# Patient Record
Sex: FEMALE | Race: WHITE | NOT HISPANIC OR LATINO | URBAN - METROPOLITAN AREA
[De-identification: names, ages, dates, MRNs, and addresses within clinical notes are randomized per-mention and may not be internally consistent; named-entity substitution may affect disease eponyms.]

---

## 2020-07-25 ENCOUNTER — TELEPHONE ENCOUNTER (OUTPATIENT)
Dept: URBAN - METROPOLITAN AREA CLINIC 13 | Facility: CLINIC | Age: 70
End: 2020-07-25

## 2020-07-25 RX ORDER — UBIDECARENONE/VIT E ACET 100MG-5
TAKE 1 CAPSULE DAILY 150 MG CAPSULE ORAL
Refills: 0 | OUTPATIENT
Start: 2008-07-18 | End: 2017-12-22

## 2020-07-25 RX ORDER — BENZONATATE 100 MG/1
TAKE 1 CAPSULE 3 TIMES DAILY AS NEEDED CAPSULE ORAL
Refills: 0 | OUTPATIENT
End: 2018-01-17

## 2020-07-25 RX ORDER — FAMOTIDINE 40 MG/1
TAKE 1 TABLET BEDTIME TABLET ORAL
Qty: 1 | Refills: 11 | OUTPATIENT
Start: 2018-01-17 | End: 2018-12-21

## 2020-07-25 RX ORDER — CETIRIZINE HYDROCHLORIDE 10 MG/1
TAKE 1 TABLET DAILY AS NEEDED TABLET, FILM COATED ORAL
Refills: 0 | OUTPATIENT
End: 2018-01-17

## 2020-07-25 RX ORDER — FLUOXETINE HYDROCHLORIDE 40 MG/1
TAKE 2 CAPSULES DAILY CAPSULE ORAL
Refills: 0 | OUTPATIENT
End: 2018-05-01

## 2020-07-26 ENCOUNTER — TELEPHONE ENCOUNTER (OUTPATIENT)
Dept: URBAN - METROPOLITAN AREA CLINIC 13 | Facility: CLINIC | Age: 70
End: 2020-07-26

## 2020-07-26 RX ORDER — CHLORHEXIDINE GLUCONATE 4 %
TAKE 1 TABLET DAILY AS DIRECTED LIQUID (ML) TOPICAL
Refills: 0 | Status: ACTIVE | COMMUNITY

## 2020-07-26 RX ORDER — CLOBETASOL PROPIONATE 0.5 MG/G
CREAM TOPICAL
Qty: 30 | Refills: 0 | Status: ACTIVE | COMMUNITY
Start: 2018-08-27

## 2020-07-26 RX ORDER — PREDNISONE 10 MG/1
TAKE 1 TABLET BY MOUTH TWICE DAILY WITH FOOD FOR FIVE DAYS TABLET ORAL
Qty: 10 | Refills: 0 | Status: ACTIVE | COMMUNITY
Start: 2018-09-06

## 2020-07-26 RX ORDER — FLUOXETINE HYDROCHLORIDE 60 MG/1
TABLET ORAL
Qty: 90 | Refills: 0 | Status: ACTIVE | COMMUNITY
Start: 2018-03-19

## 2020-07-26 RX ORDER — FLUOXETINE HYDROCHLORIDE 20 MG/1
TAKE 1 TABLET 3 TIMES DAILY TABLET ORAL
Refills: 0 | Status: ACTIVE | COMMUNITY

## 2020-07-26 RX ORDER — CELECOXIB 200 MG/1
TAKE 1 CAPSULE DAILY AS NEEDED CAPSULE ORAL
Refills: 0 | Status: ACTIVE | COMMUNITY

## 2020-07-26 RX ORDER — AZITHROMYCIN DIHYDRATE 250 MG/1
TAKE 2 TABLETS BY MOUTH ON DAY 1, THEN TAKE 1 TABLET DAILY ON DAYS 2-5 TABLET, FILM COATED ORAL
Qty: 6 | Refills: 0 | Status: ACTIVE | COMMUNITY
Start: 2018-09-06

## 2020-07-26 RX ORDER — BUPROPION HYDROCHLORIDE 300 MG/1
TAKE 1 TABLET DAILY AS DIRECTED TABLET, EXTENDED RELEASE ORAL
Refills: 0 | Status: ACTIVE | COMMUNITY

## 2020-07-26 RX ORDER — DEXLANSOPRAZOLE 60 MG/1
TAKE 1 CAPSULE DAILY PRN CAPSULE, DELAYED RELEASE ORAL
Refills: 0 | Status: ACTIVE | COMMUNITY

## 2020-07-26 RX ORDER — MULTIVIT-MIN/FA/LYCOPEN/LUTEIN .4-300-25
TAKE 1 TABLET DAILY TABLET ORAL
Refills: 0 | Status: ACTIVE | COMMUNITY

## 2023-03-27 ENCOUNTER — WEB ENCOUNTER (OUTPATIENT)
Dept: URBAN - METROPOLITAN AREA CLINIC 113 | Facility: CLINIC | Age: 73
End: 2023-03-27

## 2023-03-27 ENCOUNTER — OFFICE VISIT (OUTPATIENT)
Dept: URBAN - METROPOLITAN AREA CLINIC 113 | Facility: CLINIC | Age: 73
End: 2023-03-27
Payer: MEDICARE

## 2023-03-27 ENCOUNTER — LAB OUTSIDE AN ENCOUNTER (OUTPATIENT)
Dept: URBAN - METROPOLITAN AREA CLINIC 113 | Facility: CLINIC | Age: 73
End: 2023-03-27

## 2023-03-27 VITALS
TEMPERATURE: 98.2 F | HEART RATE: 82 BPM | WEIGHT: 167 LBS | RESPIRATION RATE: 18 BRPM | DIASTOLIC BLOOD PRESSURE: 80 MMHG | SYSTOLIC BLOOD PRESSURE: 122 MMHG | BODY MASS INDEX: 28.51 KG/M2 | HEIGHT: 64 IN

## 2023-03-27 DIAGNOSIS — K21.9 GASTROESOPHAGEAL REFLUX DISEASE: ICD-10-CM

## 2023-03-27 DIAGNOSIS — D50.9 IRON DEFICIENCY ANEMIA: ICD-10-CM

## 2023-03-27 DIAGNOSIS — R19.4 CHANGE IN BOWEL HABITS: ICD-10-CM

## 2023-03-27 PROCEDURE — 99204 OFFICE O/P NEW MOD 45 MIN: CPT | Performed by: INTERNAL MEDICINE

## 2023-03-27 RX ORDER — AZITHROMYCIN DIHYDRATE 250 MG/1
TAKE 2 TABLETS BY MOUTH ON DAY 1, THEN TAKE 1 TABLET DAILY ON DAYS 2-5 TABLET, FILM COATED ORAL
Qty: 6 | Refills: 0 | Status: ON HOLD | COMMUNITY
Start: 2018-09-06

## 2023-03-27 RX ORDER — PANTOPRAZOLE SODIUM 40 MG/1
1 TABLET TABLET, DELAYED RELEASE ORAL ONCE A DAY
Status: ACTIVE | COMMUNITY

## 2023-03-27 RX ORDER — DEXLANSOPRAZOLE 60 MG/1
TAKE 1 CAPSULE DAILY PRN CAPSULE, DELAYED RELEASE ORAL
Refills: 0 | Status: ON HOLD | COMMUNITY

## 2023-03-27 RX ORDER — PREDNISONE 10 MG/1
TAKE 1 TABLET BY MOUTH TWICE DAILY WITH FOOD FOR FIVE DAYS TABLET ORAL
Qty: 10 | Refills: 0 | Status: ON HOLD | COMMUNITY
Start: 2018-09-06

## 2023-03-27 RX ORDER — CELECOXIB 200 MG/1
TAKE 1 CAPSULE DAILY AS NEEDED CAPSULE ORAL
Refills: 0 | Status: ACTIVE | COMMUNITY

## 2023-03-27 RX ORDER — FLUOXETINE HYDROCHLORIDE 60 MG/1
TABLET ORAL
Qty: 90 | Refills: 0 | Status: ON HOLD | COMMUNITY
Start: 2018-03-19

## 2023-03-27 RX ORDER — FLUOXETINE HYDROCHLORIDE 20 MG/1
TAKE 1 TABLET 3 TIMES DAILY TABLET ORAL
Refills: 0 | Status: ACTIVE | COMMUNITY

## 2023-03-27 RX ORDER — BUPROPION HYDROCHLORIDE 300 MG/1
TAKE 1 TABLET DAILY AS DIRECTED TABLET, EXTENDED RELEASE ORAL
Refills: 0 | Status: ON HOLD | COMMUNITY

## 2023-03-27 RX ORDER — CLOBETASOL PROPIONATE 0.5 MG/G
CREAM TOPICAL
Qty: 30 | Refills: 0 | Status: ON HOLD | COMMUNITY
Start: 2018-08-27

## 2023-03-27 RX ORDER — MULTIVIT-MIN/FA/LYCOPEN/LUTEIN .4-300-25
TAKE 1 TABLET DAILY TABLET ORAL
Refills: 0 | Status: ON HOLD | COMMUNITY

## 2023-03-27 RX ORDER — ROSUVASTATIN CALCIUM 20 MG/1
1 TABLET TABLET, FILM COATED ORAL ONCE A DAY
Status: ACTIVE | COMMUNITY

## 2023-03-27 RX ORDER — CHLORHEXIDINE GLUCONATE 4 %
TAKE 1 TABLET DAILY AS DIRECTED LIQUID (ML) TOPICAL
Refills: 0 | Status: ON HOLD | COMMUNITY

## 2023-03-27 NOTE — HPI-TODAY'S VISIT:
Labs February 2023.  Hemoglobin 13.4.  Platelet count 190,000.  Iron level 71.  AST 16, ALT 13.  Ferritin 20 B12 level 325.  Methylmalonic acid level normal at 160. . Labs October 2022.  Ferritin 5.  Hematocrit 31.  Iron level 21.  Haptoglobin 170.  Reticulocyte count 1.3.  Hemoglobin 9.3.  MCV 69.  Platelet count 252,000.  Creatinine 0.7. . Colonoscopy 2018.  Two 6 mm sessile rectal polyps.  Cold snared.  Moderate sigmoid diverticulosis.  Biopsies were notable for hyperplastic polyps.  Repeat colonoscopy recommended in 10 years. . EGD 2017.  Minimal nonerosive antral gastritis.  Several benign-appearing 4 mm - 8 mm sessile gastric body polyps.  Cold biopsy.  Biopsies were notable for foveolar hyperplasia and benign fundic gland polyps. . Colonoscopy 2008.  5 mm sessile rectal polyp cold snared.  Mild sigmoid diverticulosis.  Biopsy was notable for a hyperplastic polyp.

## 2023-03-27 NOTE — HPI-TODAY'S VISIT:
71 yo F presents for evaluation of iron deficiency anemia which has now resolved with iron supplementation.  . In October she has some weakness and fatigue.  She is found to have iron deficiency anemia by her primary care doctor.  She started iron supplements.  Interestingly prior to this she has some sleep disturbance.  Insomnia.  Great difficulty sleeping at night and then this seemed to get better after she has to do iron therapy.  She not having any significant GI symptoms at this time as long as she takes her pantoprazole.  If she fails to take her pantoprazole then she has significant reflux.  On the medication she is relatively asymptomatic from this standpoint.  She has a history of significant back problems and she takes Celebrex on a daily basis.  She is not having any problems with melena or bright red blood per rectum.  She has mild constipation.  Occasionally she has some fecal seepage.  She was told to start Citrucel for this problem.  She has had no nausea or vomiting.  There is no dysphagia.  We discussed previous endoscopy findings in detail. .

## 2023-06-20 ENCOUNTER — OFFICE VISIT (OUTPATIENT)
Dept: URBAN - METROPOLITAN AREA CLINIC 113 | Facility: CLINIC | Age: 73
End: 2023-06-20
Payer: MEDICARE

## 2023-06-20 ENCOUNTER — WEB ENCOUNTER (OUTPATIENT)
Dept: URBAN - METROPOLITAN AREA CLINIC 113 | Facility: CLINIC | Age: 73
End: 2023-06-20

## 2023-06-20 VITALS
TEMPERATURE: 97.2 F | HEIGHT: 64 IN | DIASTOLIC BLOOD PRESSURE: 62 MMHG | HEART RATE: 61 BPM | WEIGHT: 167.2 LBS | BODY MASS INDEX: 28.54 KG/M2 | RESPIRATION RATE: 18 BRPM | SYSTOLIC BLOOD PRESSURE: 134 MMHG

## 2023-06-20 DIAGNOSIS — D50.0 IRON DEFICIENCY ANEMIA SECONDARY TO BLOOD LOSS (CHRONIC): ICD-10-CM

## 2023-06-20 PROCEDURE — 99214 OFFICE O/P EST MOD 30 MIN: CPT | Performed by: NURSE PRACTITIONER

## 2023-06-20 RX ORDER — DEXLANSOPRAZOLE 60 MG/1
TAKE 1 CAPSULE DAILY PRN CAPSULE, DELAYED RELEASE ORAL
Refills: 0 | Status: ON HOLD | COMMUNITY

## 2023-06-20 RX ORDER — AZITHROMYCIN DIHYDRATE 250 MG/1
TAKE 2 TABLETS BY MOUTH ON DAY 1, THEN TAKE 1 TABLET DAILY ON DAYS 2-5 TABLET, FILM COATED ORAL
Qty: 6 | Refills: 0 | Status: ON HOLD | COMMUNITY
Start: 2018-09-06

## 2023-06-20 RX ORDER — FLUOXETINE HYDROCHLORIDE 60 MG/1
TABLET ORAL
Qty: 90 | Refills: 0 | Status: ON HOLD | COMMUNITY
Start: 2018-03-19

## 2023-06-20 RX ORDER — ROSUVASTATIN CALCIUM 20 MG/1
1 TABLET TABLET, FILM COATED ORAL ONCE A DAY
Status: ACTIVE | COMMUNITY

## 2023-06-20 RX ORDER — CLOBETASOL PROPIONATE 0.5 MG/G
CREAM TOPICAL
Qty: 30 | Refills: 0 | Status: ACTIVE | COMMUNITY
Start: 2018-08-27

## 2023-06-20 RX ORDER — PREDNISONE 10 MG/1
TAKE 1 TABLET BY MOUTH TWICE DAILY WITH FOOD FOR FIVE DAYS TABLET ORAL
Qty: 10 | Refills: 0 | Status: ON HOLD | COMMUNITY
Start: 2018-09-06

## 2023-06-20 RX ORDER — CHLORHEXIDINE GLUCONATE 4 %
TAKE 1 TABLET DAILY AS DIRECTED LIQUID (ML) TOPICAL
Refills: 0 | Status: ACTIVE | COMMUNITY

## 2023-06-20 RX ORDER — FLUOXETINE HYDROCHLORIDE 20 MG/1
TAKE 1 TABLET 3 TIMES DAILY TABLET ORAL
Refills: 0 | Status: ACTIVE | COMMUNITY

## 2023-06-20 RX ORDER — PANTOPRAZOLE SODIUM 40 MG/1
1 TABLET TABLET, DELAYED RELEASE ORAL ONCE A DAY
Status: ACTIVE | COMMUNITY

## 2023-06-20 RX ORDER — BUPROPION HYDROCHLORIDE 300 MG/1
TAKE 1 TABLET DAILY AS DIRECTED TABLET, EXTENDED RELEASE ORAL
Refills: 0 | Status: ON HOLD | COMMUNITY

## 2023-06-20 RX ORDER — MULTIVIT-MIN/FA/LYCOPEN/LUTEIN .4-300-25
TAKE 1 TABLET DAILY TABLET ORAL
Refills: 0 | Status: ON HOLD | COMMUNITY

## 2023-06-20 RX ORDER — CELECOXIB 200 MG/1
TAKE 1 CAPSULE DAILY AS NEEDED CAPSULE ORAL
Refills: 0 | Status: ACTIVE | COMMUNITY

## 2023-06-20 NOTE — HPI-TODAY'S VISIT:
73 yo female with a history of hyperlipidemia, GERD, chronic back on Celebrex, presenting for follow-up of iron deficiency anemia. She was seen in the office in March for evaluation of iron deficiency anemia. Previous colonoscopy was unremarkable. An EGD with small bowel enteroscopy was recommended but she did not proceed with procedure. Regarding constipation and stool leakage, she was encouraged use of a daily fiber supplement. Labs on 5/19/23 show H/H 11.2/35.4, MCV 85.3, Plt 197, WBC 4.5. Iron 51, TIBC 408, iron sat 13. Ferritin 9. Normal vitamin B12. Her bowel habits have improved. She is having fairly regular bowel movements, with less frequent episodes of constipation and stool urgency with leakage. She discontinued her fiber supplement due to side effect of bloating. She denies abdominal pain, nausea, vomiting or blood in the stool. She is ready to proceed with endoscopic assessment as recommended last visit, and is unsure why this was never scheduled. Recent labs with her PCP showed persistent iron deficiency. She discontinued oral iron d/t side effects.

## 2023-07-12 ENCOUNTER — LAB OUTSIDE AN ENCOUNTER (OUTPATIENT)
Dept: URBAN - METROPOLITAN AREA CLINIC 113 | Facility: CLINIC | Age: 73
End: 2023-07-12

## 2023-07-12 ENCOUNTER — OFFICE VISIT (OUTPATIENT)
Dept: URBAN - METROPOLITAN AREA MEDICAL CENTER 19 | Facility: MEDICAL CENTER | Age: 73
End: 2023-07-12
Payer: MEDICARE

## 2023-07-12 ENCOUNTER — TELEPHONE ENCOUNTER (OUTPATIENT)
Dept: URBAN - METROPOLITAN AREA CLINIC 113 | Facility: CLINIC | Age: 73
End: 2023-07-12

## 2023-07-12 DIAGNOSIS — D50.9 IRON DEFICIENCY ANEMIA: ICD-10-CM

## 2023-07-12 DIAGNOSIS — J38.5 LARYNGOSPASM: ICD-10-CM

## 2023-07-12 PROCEDURE — 992 APS NON BILLABLE: Performed by: INTERNAL MEDICINE

## 2023-07-12 RX ORDER — MULTIVIT-MIN/FA/LYCOPEN/LUTEIN .4-300-25
TAKE 1 TABLET DAILY TABLET ORAL
Refills: 0 | Status: ON HOLD | COMMUNITY

## 2023-07-12 RX ORDER — FLUOXETINE HYDROCHLORIDE 20 MG/1
TAKE 1 TABLET 3 TIMES DAILY TABLET ORAL
Refills: 0 | Status: ACTIVE | COMMUNITY

## 2023-07-12 RX ORDER — BUPROPION HYDROCHLORIDE 300 MG/1
TAKE 1 TABLET DAILY AS DIRECTED TABLET, EXTENDED RELEASE ORAL
Refills: 0 | Status: ON HOLD | COMMUNITY

## 2023-07-12 RX ORDER — PANTOPRAZOLE SODIUM 40 MG/1
1 TABLET TABLET, DELAYED RELEASE ORAL ONCE A DAY
Status: ACTIVE | COMMUNITY

## 2023-07-12 RX ORDER — CELECOXIB 200 MG/1
TAKE 1 CAPSULE DAILY AS NEEDED CAPSULE ORAL
Refills: 0 | Status: ACTIVE | COMMUNITY

## 2023-07-12 RX ORDER — AZITHROMYCIN DIHYDRATE 250 MG/1
TAKE 2 TABLETS BY MOUTH ON DAY 1, THEN TAKE 1 TABLET DAILY ON DAYS 2-5 TABLET, FILM COATED ORAL
Qty: 6 | Refills: 0 | Status: ON HOLD | COMMUNITY
Start: 2018-09-06

## 2023-07-12 RX ORDER — ROSUVASTATIN CALCIUM 20 MG/1
1 TABLET TABLET, FILM COATED ORAL ONCE A DAY
Status: ACTIVE | COMMUNITY

## 2023-07-12 RX ORDER — CLOBETASOL PROPIONATE 0.5 MG/G
CREAM TOPICAL
Qty: 30 | Refills: 0 | Status: ACTIVE | COMMUNITY
Start: 2018-08-27

## 2023-07-12 RX ORDER — DEXLANSOPRAZOLE 60 MG/1
TAKE 1 CAPSULE DAILY PRN CAPSULE, DELAYED RELEASE ORAL
Refills: 0 | Status: ON HOLD | COMMUNITY

## 2023-07-12 RX ORDER — PREDNISONE 10 MG/1
TAKE 1 TABLET BY MOUTH TWICE DAILY WITH FOOD FOR FIVE DAYS TABLET ORAL
Qty: 10 | Refills: 0 | Status: ON HOLD | COMMUNITY
Start: 2018-09-06

## 2023-07-12 RX ORDER — CHLORHEXIDINE GLUCONATE 4 %
TAKE 1 TABLET DAILY AS DIRECTED LIQUID (ML) TOPICAL
Refills: 0 | Status: ACTIVE | COMMUNITY

## 2023-07-12 RX ORDER — FLUOXETINE HYDROCHLORIDE 60 MG/1
TABLET ORAL
Qty: 90 | Refills: 0 | Status: ON HOLD | COMMUNITY
Start: 2018-03-19

## 2024-02-01 ENCOUNTER — LAB (OUTPATIENT)
Dept: URBAN - METROPOLITAN AREA CLINIC 113 | Facility: CLINIC | Age: 74
End: 2024-02-01

## 2024-02-01 ENCOUNTER — OV EP (OUTPATIENT)
Dept: URBAN - METROPOLITAN AREA CLINIC 113 | Facility: CLINIC | Age: 74
End: 2024-02-01
Payer: MEDICARE

## 2024-02-01 VITALS
HEART RATE: 66 BPM | DIASTOLIC BLOOD PRESSURE: 65 MMHG | HEIGHT: 64 IN | SYSTOLIC BLOOD PRESSURE: 130 MMHG | RESPIRATION RATE: 18 BRPM | WEIGHT: 168 LBS | TEMPERATURE: 97.6 F | BODY MASS INDEX: 28.68 KG/M2

## 2024-02-01 DIAGNOSIS — D50.0 IRON DEFICIENCY ANEMIA SECONDARY TO BLOOD LOSS (CHRONIC): ICD-10-CM

## 2024-02-01 DIAGNOSIS — K59.09 CHANGE IN BOWEL MOVEMENTS INTERMITTENT CONSTIPATION. URGENCY IN THE MORNING.: ICD-10-CM

## 2024-02-01 PROCEDURE — 99214 OFFICE O/P EST MOD 30 MIN: CPT | Performed by: NURSE PRACTITIONER

## 2024-02-01 RX ORDER — AZITHROMYCIN DIHYDRATE 250 MG/1
TAKE 2 TABLETS BY MOUTH ON DAY 1, THEN TAKE 1 TABLET DAILY ON DAYS 2-5 TABLET, FILM COATED ORAL
Qty: 6 | Refills: 0 | Status: ON HOLD | COMMUNITY
Start: 2018-09-06

## 2024-02-01 RX ORDER — MULTIVIT-MIN/FA/LYCOPEN/LUTEIN .4-300-25
TAKE 1 TABLET DAILY TABLET ORAL
Refills: 0 | Status: ON HOLD | COMMUNITY

## 2024-02-01 RX ORDER — FLUOXETINE HYDROCHLORIDE 20 MG/1
TAKE 1 TABLET 3 TIMES DAILY TABLET ORAL
Refills: 0 | Status: ACTIVE | COMMUNITY

## 2024-02-01 RX ORDER — PANTOPRAZOLE SODIUM 40 MG/1
1 TABLET TABLET, DELAYED RELEASE ORAL ONCE A DAY
Status: ACTIVE | COMMUNITY

## 2024-02-01 RX ORDER — CLOBETASOL PROPIONATE 0.5 MG/G
CREAM TOPICAL
Qty: 30 | Refills: 0 | Status: ACTIVE | COMMUNITY
Start: 2018-08-27

## 2024-02-01 RX ORDER — DEXLANSOPRAZOLE 60 MG/1
TAKE 1 CAPSULE DAILY PRN CAPSULE, DELAYED RELEASE ORAL
Refills: 0 | Status: ON HOLD | COMMUNITY

## 2024-02-01 RX ORDER — CHLORHEXIDINE GLUCONATE 4 %
TAKE 1 TABLET DAILY AS DIRECTED LIQUID (ML) TOPICAL
Refills: 0 | Status: ON HOLD | COMMUNITY

## 2024-02-01 RX ORDER — CELECOXIB 200 MG/1
TAKE 1 CAPSULE DAILY AS NEEDED CAPSULE ORAL
Refills: 0 | Status: ACTIVE | COMMUNITY

## 2024-02-01 RX ORDER — ZOLPIDEM TARTRATE 10 MG/1
1 TABLET AT BEDTIME AS NEEDED TABLET, FILM COATED ORAL ONCE A DAY
Status: ACTIVE | COMMUNITY

## 2024-02-01 RX ORDER — BUPROPION HYDROCHLORIDE 300 MG/1
TAKE 1 TABLET DAILY AS DIRECTED TABLET, EXTENDED RELEASE ORAL
Refills: 0 | Status: ON HOLD | COMMUNITY

## 2024-02-01 RX ORDER — ROSUVASTATIN CALCIUM 20 MG/1
1 TABLET TABLET, FILM COATED ORAL ONCE A DAY
Status: ACTIVE | COMMUNITY

## 2024-02-01 RX ORDER — PREDNISONE 10 MG/1
TAKE 1 TABLET BY MOUTH TWICE DAILY WITH FOOD FOR FIVE DAYS TABLET ORAL
Qty: 10 | Refills: 0 | Status: ON HOLD | COMMUNITY
Start: 2018-09-06

## 2024-02-01 RX ORDER — FLUOXETINE HYDROCHLORIDE 60 MG/1
TABLET ORAL
Qty: 90 | Refills: 0 | Status: ON HOLD | COMMUNITY
Start: 2018-03-19

## 2024-02-01 NOTE — HPI-TODAY'S VISIT:
72yo female with a history of hyperlipidemia, GERD, chronic back on Celebrex, referred back to our office by Dr. Burks for evaluation of iron deficiency.  She was last seen in the office in June 2023 for follow-up regarding iron deficiency anemia. She was to proceed with EGD with small bowel enteroscopy as previously recommended, with consideration for referral to hematology for IV iron given inability to tolerate oral supplementation.  EGD with small bowel enteroscopy 7/12/23 was aborted due to immediate laryngospasm. She was recommended CT enterography and colonoscopy, but these have not been scheduled.  Overall, she is doing fairly well from a GI perspective. She has a bowel movement most days, but admits to occasional constipation and straining. She is not on a bowel regimen. No abdominal pain, nausea or vomiting. She has not seen any obvious blood in the stool. She remains on Celebrex daily but denies other OTC NSAID use. She has been referred to a hematologist in [place]. She started back on oral iron about two weeks ago. She takes it with food prior to bedtime which makes it easier to tolerate. She has not experienced any side effects.  Recent labs with her PCP 12/29/23 show H/H 9.7/31, MCV 76.1, Plt 214, WBC 4.5. Iron 23, TIBC 419, iron sat 5. Ferritin 8. Normal BMP and hepatic function panel. Vitamin B12 331.

## 2024-02-01 NOTE — HPI-OTHER HISTORIES
Labs on 5/19/23 show H/H 11.2/35.4, MCV 85.3, Plt 197, WBC 4.5. Iron 51, TIBC 408, iron sat 13. Ferritin 9. Normal vitamin B12.  Labs February 2023.  Hemoglobin 13.4.  Platelet count 190,000.  Iron level 71.  AST 16, ALT 13.  Ferritin 20 B12 level 325.  Methylmalonic acid level normal at 160.  Labs October 2022.  Ferritin 5.  Hematocrit 31.  Iron level 21.  Haptoglobin 170.  Reticulocyte count 1.3.  Hemoglobin 9.3.  MCV 69.  Platelet count 252,000.  Creatinine 0.7.  Colonoscopy 2018.  Two 6 mm sessile rectal polyps.  Cold snared.  Moderate sigmoid diverticulosis.  Biopsies were notable for hyperplastic polyps.  Repeat colonoscopy recommended in 10 years.  EGD 2017.  Minimal nonerosive antral gastritis.  Several benign-appearing 4 mm - 8 mm sessile gastric body polyps.  Cold biopsy.  Biopsies were notable for foveolar hyperplasia and benign fundic gland polyps.  Colonoscopy 2008.  5 mm sessile rectal polyp cold snared.  Mild sigmoid diverticulosis.  Biopsy was notable for a hyperplastic polyp.

## 2024-03-14 ENCOUNTER — COLON (OUTPATIENT)
Dept: URBAN - METROPOLITAN AREA SURGERY CENTER 25 | Facility: SURGERY CENTER | Age: 74
End: 2024-03-14
Payer: MEDICARE

## 2024-03-14 ENCOUNTER — LAB (OUTPATIENT)
Dept: URBAN - METROPOLITAN AREA CLINIC 113 | Facility: CLINIC | Age: 74
End: 2024-03-14

## 2024-03-14 DIAGNOSIS — D50.9 IRON DEFICIENCY ANEMIA: ICD-10-CM

## 2024-03-14 PROCEDURE — 45378 DIAGNOSTIC COLONOSCOPY: CPT | Performed by: INTERNAL MEDICINE

## 2024-03-14 RX ORDER — PREDNISONE 10 MG/1
TAKE 1 TABLET BY MOUTH TWICE DAILY WITH FOOD FOR FIVE DAYS TABLET ORAL
Qty: 10 | Refills: 0 | Status: ON HOLD | COMMUNITY
Start: 2018-09-06

## 2024-03-14 RX ORDER — FLUOXETINE HYDROCHLORIDE 20 MG/1
TAKE 1 TABLET 3 TIMES DAILY TABLET ORAL
Refills: 0 | Status: ACTIVE | COMMUNITY

## 2024-03-14 RX ORDER — DEXLANSOPRAZOLE 60 MG/1
TAKE 1 CAPSULE DAILY PRN CAPSULE, DELAYED RELEASE ORAL
Refills: 0 | Status: ON HOLD | COMMUNITY

## 2024-03-14 RX ORDER — ZOLPIDEM TARTRATE 10 MG/1
1 TABLET AT BEDTIME AS NEEDED TABLET, FILM COATED ORAL ONCE A DAY
Status: ACTIVE | COMMUNITY

## 2024-03-14 RX ORDER — PANTOPRAZOLE SODIUM 40 MG/1
1 TABLET TABLET, DELAYED RELEASE ORAL ONCE A DAY
Status: ACTIVE | COMMUNITY

## 2024-03-14 RX ORDER — BUPROPION HYDROCHLORIDE 300 MG/1
TAKE 1 TABLET DAILY AS DIRECTED TABLET, EXTENDED RELEASE ORAL
Refills: 0 | Status: ON HOLD | COMMUNITY

## 2024-03-14 RX ORDER — ROSUVASTATIN CALCIUM 20 MG/1
1 TABLET TABLET, FILM COATED ORAL ONCE A DAY
Status: ACTIVE | COMMUNITY

## 2024-03-14 RX ORDER — CLOBETASOL PROPIONATE 0.5 MG/G
CREAM TOPICAL
Qty: 30 | Refills: 0 | Status: ACTIVE | COMMUNITY
Start: 2018-08-27

## 2024-03-14 RX ORDER — CHLORHEXIDINE GLUCONATE 4 %
TAKE 1 TABLET DAILY AS DIRECTED LIQUID (ML) TOPICAL
Refills: 0 | Status: ON HOLD | COMMUNITY

## 2024-03-14 RX ORDER — AZITHROMYCIN DIHYDRATE 250 MG/1
TAKE 2 TABLETS BY MOUTH ON DAY 1, THEN TAKE 1 TABLET DAILY ON DAYS 2-5 TABLET, FILM COATED ORAL
Qty: 6 | Refills: 0 | Status: ON HOLD | COMMUNITY
Start: 2018-09-06

## 2024-03-14 RX ORDER — MULTIVIT-MIN/FA/LYCOPEN/LUTEIN .4-300-25
TAKE 1 TABLET DAILY TABLET ORAL
Refills: 0 | Status: ON HOLD | COMMUNITY

## 2024-03-14 RX ORDER — FLUOXETINE HYDROCHLORIDE 60 MG/1
TABLET ORAL
Qty: 90 | Refills: 0 | Status: ON HOLD | COMMUNITY
Start: 2018-03-19

## 2024-03-14 RX ORDER — CELECOXIB 200 MG/1
TAKE 1 CAPSULE DAILY AS NEEDED CAPSULE ORAL
Refills: 0 | Status: ACTIVE | COMMUNITY

## 2024-04-10 ENCOUNTER — SBE (OUTPATIENT)
Dept: URBAN - METROPOLITAN AREA MEDICAL CENTER 19 | Facility: MEDICAL CENTER | Age: 74
End: 2024-04-10
Payer: MEDICARE

## 2024-04-10 ENCOUNTER — LAB (OUTPATIENT)
Dept: URBAN - METROPOLITAN AREA CLINIC 113 | Facility: CLINIC | Age: 74
End: 2024-04-10

## 2024-04-10 DIAGNOSIS — K92.2 ACUTE GASTROINTESTINAL BLEEDING: ICD-10-CM

## 2024-04-10 DIAGNOSIS — K31.7 BENIGN GASTRIC POLYP: ICD-10-CM

## 2024-04-10 PROCEDURE — 44364 SMALL BOWEL ENDOSCOPY: CPT | Performed by: INTERNAL MEDICINE

## 2024-04-10 RX ORDER — ZOLPIDEM TARTRATE 10 MG/1
1 TABLET AT BEDTIME AS NEEDED TABLET, FILM COATED ORAL ONCE A DAY
Status: ACTIVE | COMMUNITY

## 2024-04-10 RX ORDER — MULTIVIT-MIN/FA/LYCOPEN/LUTEIN .4-300-25
TAKE 1 TABLET DAILY TABLET ORAL
Refills: 0 | Status: ON HOLD | COMMUNITY

## 2024-04-10 RX ORDER — FLUOXETINE HYDROCHLORIDE 20 MG/1
TAKE 1 TABLET 3 TIMES DAILY TABLET ORAL
Refills: 0 | Status: ACTIVE | COMMUNITY

## 2024-04-10 RX ORDER — CLOBETASOL PROPIONATE 0.5 MG/G
CREAM TOPICAL
Qty: 30 | Refills: 0 | Status: ACTIVE | COMMUNITY
Start: 2018-08-27

## 2024-04-10 RX ORDER — PANTOPRAZOLE SODIUM 40 MG/1
1 TABLET TABLET, DELAYED RELEASE ORAL ONCE A DAY
Status: ACTIVE | COMMUNITY

## 2024-04-10 RX ORDER — CELECOXIB 200 MG/1
TAKE 1 CAPSULE DAILY AS NEEDED CAPSULE ORAL
Refills: 0 | Status: ACTIVE | COMMUNITY

## 2024-04-10 RX ORDER — BUPROPION HYDROCHLORIDE 300 MG/1
TAKE 1 TABLET DAILY AS DIRECTED TABLET, EXTENDED RELEASE ORAL
Refills: 0 | Status: ON HOLD | COMMUNITY

## 2024-04-10 RX ORDER — PREDNISONE 10 MG/1
TAKE 1 TABLET BY MOUTH TWICE DAILY WITH FOOD FOR FIVE DAYS TABLET ORAL
Qty: 10 | Refills: 0 | Status: ON HOLD | COMMUNITY
Start: 2018-09-06

## 2024-04-10 RX ORDER — DEXLANSOPRAZOLE 60 MG/1
TAKE 1 CAPSULE DAILY PRN CAPSULE, DELAYED RELEASE ORAL
Refills: 0 | Status: ON HOLD | COMMUNITY

## 2024-04-10 RX ORDER — FLUOXETINE HYDROCHLORIDE 60 MG/1
TABLET ORAL
Qty: 90 | Refills: 0 | Status: ON HOLD | COMMUNITY
Start: 2018-03-19

## 2024-04-10 RX ORDER — CHLORHEXIDINE GLUCONATE 4 %
TAKE 1 TABLET DAILY AS DIRECTED LIQUID (ML) TOPICAL
Refills: 0 | Status: ON HOLD | COMMUNITY

## 2024-04-10 RX ORDER — ROSUVASTATIN CALCIUM 20 MG/1
1 TABLET TABLET, FILM COATED ORAL ONCE A DAY
Status: ACTIVE | COMMUNITY

## 2024-04-10 RX ORDER — AZITHROMYCIN DIHYDRATE 250 MG/1
TAKE 2 TABLETS BY MOUTH ON DAY 1, THEN TAKE 1 TABLET DAILY ON DAYS 2-5 TABLET, FILM COATED ORAL
Qty: 6 | Refills: 0 | Status: ON HOLD | COMMUNITY
Start: 2018-09-06

## 2024-05-07 ENCOUNTER — OFFICE VISIT (OUTPATIENT)
Dept: URBAN - METROPOLITAN AREA CLINIC 113 | Facility: CLINIC | Age: 74
End: 2024-05-07
Payer: MEDICARE

## 2024-05-07 ENCOUNTER — TELEPHONE ENCOUNTER (OUTPATIENT)
Dept: URBAN - METROPOLITAN AREA CLINIC 113 | Facility: CLINIC | Age: 74
End: 2024-05-07

## 2024-05-07 ENCOUNTER — DASHBOARD ENCOUNTERS (OUTPATIENT)
Age: 74
End: 2024-05-07

## 2024-05-07 VITALS
WEIGHT: 159.6 LBS | SYSTOLIC BLOOD PRESSURE: 114 MMHG | TEMPERATURE: 98 F | DIASTOLIC BLOOD PRESSURE: 67 MMHG | RESPIRATION RATE: 18 BRPM | BODY MASS INDEX: 27.25 KG/M2 | HEIGHT: 64 IN | HEART RATE: 62 BPM

## 2024-05-07 DIAGNOSIS — R19.4 CHANGE IN BOWEL HABITS: ICD-10-CM

## 2024-05-07 DIAGNOSIS — K21.9 GASTROESOPHAGEAL REFLUX DISEASE: ICD-10-CM

## 2024-05-07 DIAGNOSIS — K55.20 ANGIODYSPLASIA OF INTESTINE: ICD-10-CM

## 2024-05-07 DIAGNOSIS — K59.00 CONSTIPATION, UNSPECIFIED CONSTIPATION TYPE: ICD-10-CM

## 2024-05-07 DIAGNOSIS — D50.9 IRON DEFICIENCY ANEMIA: ICD-10-CM

## 2024-05-07 DIAGNOSIS — D50.0 IRON DEFICIENCY ANEMIA SECONDARY TO BLOOD LOSS (CHRONIC): ICD-10-CM

## 2024-05-07 PROCEDURE — 99214 OFFICE O/P EST MOD 30 MIN: CPT | Performed by: INTERNAL MEDICINE

## 2024-05-07 RX ORDER — PANTOPRAZOLE SODIUM 40 MG/1
1 TABLET TABLET, DELAYED RELEASE ORAL ONCE A DAY
Status: ACTIVE | COMMUNITY

## 2024-05-07 RX ORDER — CELECOXIB 200 MG/1
1 CAPSULE WITH FOOD CAPSULE ORAL ONCE A DAY
Status: ACTIVE | COMMUNITY

## 2024-05-07 RX ORDER — ZOLPIDEM TARTRATE 10 MG/1
1 TABLET AT BEDTIME AS NEEDED TABLET, FILM COATED ORAL ONCE A DAY
Status: ACTIVE | COMMUNITY

## 2024-05-07 RX ORDER — ESTRADIOL 0.1 MG/G
AS DIRECTED CREAM VAGINAL
Status: ACTIVE | COMMUNITY

## 2024-05-07 RX ORDER — CLOBETASOL PROPIONATE 0.5 MG/G
CREAM TOPICAL
Qty: 30 | Refills: 0 | Status: ACTIVE | COMMUNITY
Start: 2018-08-27

## 2024-05-07 RX ORDER — BUPROPION HYDROCHLORIDE 100 MG/1
2 TABLETS TABLET, FILM COATED ORAL
Status: ACTIVE | COMMUNITY

## 2024-05-07 RX ORDER — DICLOFENAC SODIUM TOPICAL GEL, 1% 10 MG/G
AS DIRECTED GEL TOPICAL
Status: ACTIVE | COMMUNITY

## 2024-05-07 RX ORDER — ROSUVASTATIN 20 MG/1
1 TABLET TABLET, FILM COATED ORAL ONCE A DAY
Status: ACTIVE | COMMUNITY

## 2024-05-07 RX ORDER — FERROUS GLUCONATE 324 MG
1 TABLET TABLET ORAL
Status: ACTIVE | COMMUNITY

## 2024-05-07 RX ORDER — FLUOXETINE HYDROCHLORIDE 20 MG/1
TAKE 1 TABLET 3 TIMES DAILY TABLET ORAL
Refills: 0 | Status: ACTIVE | COMMUNITY

## 2024-09-03 ENCOUNTER — OFFICE VISIT (OUTPATIENT)
Dept: URBAN - METROPOLITAN AREA CLINIC 113 | Facility: CLINIC | Age: 74
End: 2024-09-03
Payer: MEDICARE

## 2024-09-03 VITALS
HEIGHT: 64 IN | RESPIRATION RATE: 18 BRPM | SYSTOLIC BLOOD PRESSURE: 145 MMHG | BODY MASS INDEX: 28.17 KG/M2 | HEART RATE: 69 BPM | TEMPERATURE: 97.7 F | WEIGHT: 165 LBS | DIASTOLIC BLOOD PRESSURE: 75 MMHG

## 2024-09-03 DIAGNOSIS — D50.8 OTHER IRON DEFICIENCY ANEMIA: ICD-10-CM

## 2024-09-03 DIAGNOSIS — K21.9 GASTROESOPHAGEAL REFLUX DISEASE: ICD-10-CM

## 2024-09-03 PROCEDURE — 99213 OFFICE O/P EST LOW 20 MIN: CPT | Performed by: INTERNAL MEDICINE

## 2024-09-03 RX ORDER — ZOLPIDEM TARTRATE 10 MG/1
1 TABLET AT BEDTIME AS NEEDED TABLET, FILM COATED ORAL ONCE A DAY
Status: ACTIVE | COMMUNITY

## 2024-09-03 RX ORDER — FLUOXETINE HYDROCHLORIDE 20 MG/1
TAKE 1 TABLET 3 TIMES DAILY TABLET ORAL
Refills: 0 | Status: ACTIVE | COMMUNITY

## 2024-09-03 RX ORDER — CELECOXIB 200 MG/1
1 CAPSULE WITH FOOD CAPSULE ORAL ONCE A DAY
Status: ACTIVE | COMMUNITY

## 2024-09-03 RX ORDER — ROSUVASTATIN 20 MG/1
1 TABLET TABLET, FILM COATED ORAL ONCE A DAY
Status: ACTIVE | COMMUNITY

## 2024-09-03 RX ORDER — PANTOPRAZOLE SODIUM 40 MG/1
1 TABLET TABLET, DELAYED RELEASE ORAL ONCE A DAY
Status: ACTIVE | COMMUNITY

## 2024-09-03 RX ORDER — BUPROPION HYDROCHLORIDE 100 MG/1
2 TABLETS TABLET, FILM COATED ORAL
Status: ACTIVE | COMMUNITY

## 2024-09-03 RX ORDER — CLOBETASOL PROPIONATE 0.5 MG/G
CREAM TOPICAL
Qty: 30 | Refills: 0 | Status: ACTIVE | COMMUNITY
Start: 2018-08-27

## 2024-09-03 RX ORDER — FERROUS GLUCONATE 324 MG
1 TABLET TABLET ORAL
Status: ACTIVE | COMMUNITY

## 2024-09-03 RX ORDER — ESTRADIOL 0.1 MG/G
AS DIRECTED CREAM VAGINAL
Status: ACTIVE | COMMUNITY

## 2024-09-03 NOTE — HPI-OTHER HISTORIES
Labs 5/19/23 show H/H 11.2/35.4, MCV 85.3, Plt 197, WBC 4.5. Iron 51, TIBC 408, iron sat 13. Ferritin 9. Normal vitamin B12.  Labs February 2023.  Hemoglobin 13.4.  Platelet count 190,000.  Iron level 71.  AST 16, ALT 13.  Ferritin 20 B12 level 325.  Methylmalonic acid level normal at 160.  Labs October 2022.  Ferritin 5.  Hematocrit 31.  Iron level 21.  Haptoglobin 170.  Reticulocyte count 1.3.  Hemoglobin 9.3.  MCV 69.  Platelet count 252,000.  Creatinine 0.7.  Colonoscopy 2018.  Two 6 mm sessile rectal polyps.  Cold snared.  Moderate sigmoid diverticulosis.  Biopsies were notable for hyperplastic polyps.  Repeat colonoscopy recommended in 10 years.  EGD 2017.  Minimal nonerosive antral gastritis.  Several benign-appearing 4 mm - 8 mm sessile gastric body polyps.  Cold biopsy.  Biopsies were notable for foveolar hyperplasia and benign fundic gland polyps.  Colonoscopy 2008.  5 mm sessile rectal polyp cold snared.  Mild sigmoid diverticulosis.  Biopsy was notable for a hyperplastic polyp.

## 2024-09-03 NOTE — HPI-TODAY'S VISIT:
Labs. July 2024. Hemoglobin 12.8, MCV 89. Platelet count 174,000. Ferritin 20. Iron saturation 22%. Iron level 83. Creatinine 0.8. LFTs normal. B12 285.  April 15, 2024.  Creatinine 0.6.  AST 13, ALT 12.  Iron saturation 12%.  B12 338.  Ferritin 16.  Small bowel enteroscopy.  April 2024.  Moderate 5 cm hiatal hernia.  Significantly tortuous distal esophagus.  Multiple benign-appearing gastric body polyps.  Pathology was notable for benign fundic gland polyps.  Barium swallow.  April 2024.  Moderate hiatal hernia with diffuse esophageal spasm identified.  The 13 mm barium tablet passed without difficulty.  Colonoscopy.  March 2024.  Markedly tortuous sigmoid colon requiring switch to upper scope.  Moderate sigmoid diverticulosis.  Otherwise normal colonoscopy without AVMs noted.  CT abdomen pelvis with contrast.  February 2024.  Multiple tiny foci of mucosal and submucosal enhancement suggestive of AVMs within the jejunum.  These were not visualized on small bowel enteroscopy.  March 2024.  Labs.  Hemoglobin 12.4.  MCV 86.  Platelet count 203,000.  Ferritin 17.  Iron level 65.  Saturation 16%.  EGD with small bowel enteroscopy 7/12/23 was aborted due to immediate laryngospasm.  Labs 12/29/23 show H/H 9.7/31, MCV 76.1, Plt 214, WBC 4.5. Iron 23, TIBC 419, iron sat 5. Ferritin 8. Normal BMP and hepatic function panel. Vitamin B12 331.

## 2024-09-03 NOTE — HPI-TODAY'S VISIT:
74yo female with a history of hyperlipidemia, GERD, chronic back on Celebrex, referred back to our office by Dr. Burks for evaluation of iron deficiency.  She is doing very well. No current GI symptoms. No dysphagia heartburn or regurgitation. No abdominal pain. No diarrhea constipation. No rectal bleeding or melena.

## 2025-03-03 ENCOUNTER — OFFICE VISIT (OUTPATIENT)
Dept: URBAN - METROPOLITAN AREA CLINIC 113 | Facility: CLINIC | Age: 75
End: 2025-03-03
Payer: MEDICARE

## 2025-03-03 VITALS
WEIGHT: 161.8 LBS | BODY MASS INDEX: 27.62 KG/M2 | TEMPERATURE: 98.1 F | RESPIRATION RATE: 18 BRPM | HEART RATE: 75 BPM | DIASTOLIC BLOOD PRESSURE: 67 MMHG | HEIGHT: 64 IN | SYSTOLIC BLOOD PRESSURE: 136 MMHG

## 2025-03-03 DIAGNOSIS — D50.0 IRON DEFICIENCY ANEMIA SECONDARY TO BLOOD LOSS (CHRONIC): ICD-10-CM

## 2025-03-03 DIAGNOSIS — K55.20 ANGIODYSPLASIA OF INTESTINE: ICD-10-CM

## 2025-03-03 DIAGNOSIS — D50.9 IRON DEFICIENCY ANEMIA: ICD-10-CM

## 2025-03-03 DIAGNOSIS — R19.4 CHANGE IN BOWEL HABITS: ICD-10-CM

## 2025-03-03 DIAGNOSIS — K44.9 HIATAL HERNIA: ICD-10-CM

## 2025-03-03 DIAGNOSIS — K21.9 GASTROESOPHAGEAL REFLUX DISEASE: ICD-10-CM

## 2025-03-03 DIAGNOSIS — K59.00 CONSTIPATION, UNSPECIFIED CONSTIPATION TYPE: ICD-10-CM

## 2025-03-03 PROCEDURE — 99214 OFFICE O/P EST MOD 30 MIN: CPT | Performed by: INTERNAL MEDICINE

## 2025-03-03 RX ORDER — ROSUVASTATIN 20 MG/1
1 TABLET TABLET, FILM COATED ORAL ONCE A DAY
Status: ACTIVE | COMMUNITY

## 2025-03-03 RX ORDER — CELECOXIB 200 MG/1
1 CAPSULE WITH FOOD CAPSULE ORAL ONCE A DAY
Status: ACTIVE | COMMUNITY

## 2025-03-03 RX ORDER — BUPROPION HYDROCHLORIDE 100 MG/1
2 TABLETS TABLET, FILM COATED ORAL
Status: ACTIVE | COMMUNITY

## 2025-03-03 RX ORDER — ZOLPIDEM TARTRATE 10 MG/1
1 TABLET AT BEDTIME AS NEEDED TABLET, FILM COATED ORAL ONCE A DAY
Status: ACTIVE | COMMUNITY

## 2025-03-03 RX ORDER — PANTOPRAZOLE SODIUM 40 MG/1
1 TABLET TABLET, DELAYED RELEASE ORAL ONCE A DAY
Status: ACTIVE | COMMUNITY

## 2025-03-03 RX ORDER — FLUOXETINE HYDROCHLORIDE 20 MG/1
TAKE 1 TABLET 3 TIMES DAILY TABLET ORAL
Refills: 0 | Status: ACTIVE | COMMUNITY

## 2025-03-03 RX ORDER — ESTRADIOL 0.1 MG/G
AS DIRECTED CREAM VAGINAL
Status: ACTIVE | COMMUNITY

## 2025-03-03 RX ORDER — CLOBETASOL PROPIONATE 0.5 MG/G
CREAM TOPICAL
Qty: 30 | Refills: 0 | Status: ACTIVE | COMMUNITY
Start: 2018-08-27

## 2025-03-03 RX ORDER — FERROUS GLUCONATE 324 MG
1 TABLET TABLET ORAL
Status: ACTIVE | COMMUNITY

## 2025-03-03 NOTE — HPI-TODAY'S VISIT:
75 yo female with a history of hyperlipidemia, GERD, chronic back on Celebrex, referred back to our office by Dr. Burks for evaluation of iron deficiency.  She is not having any GI symptoms today. No dysphagia heartburn or regurgitation. No abdominal pain. No diarrhea or constipation. No rectal bleeding or melena. Weight is stable and appetite is fine. She is compliant with her iron therapy as well as with her pantoprazole.

## 2025-03-03 NOTE — HPI-TODAY'S VISIT:
Labs. February 2025. Hemoglobin 12.0, MCV 88, platelet count 197K, triglycerides 99, creatinine 0.6, B12 261, MMA is normal at 163, AST 23, ALT 14, bilirubin 0.4, iron level 52, iron saturation 12%, ferritin 11.  Labs. July 2024. Hemoglobin 12.8, MCV 89. Platelet count 174,000. Ferritin 20. Iron saturation 22%. Iron level 83. Creatinine 0.8. LFTs normal. B12 285.  April 15, 2024.  Creatinine 0.6.  AST 13, ALT 12.  Iron saturation 12%.  B12 338.  Ferritin 16.  Small bowel enteroscopy.  April 2024.  Moderate 5 cm hiatal hernia.  Significantly tortuous distal esophagus.  Multiple benign-appearing gastric body polyps.  Pathology was notable for benign fundic gland polyps.  Barium swallow.  April 2024.  Moderate hiatal hernia with diffuse esophageal spasm identified.  The 13 mm barium tablet passed without difficulty.  Colonoscopy.  March 2024.  Markedly tortuous sigmoid colon requiring switch to upper scope.  Moderate sigmoid diverticulosis.  Otherwise normal colonoscopy without AVMs noted.  CT abdomen pelvis with contrast.  February 2024.  Multiple tiny foci of mucosal and submucosal enhancement suggestive of AVMs within the jejunum.  These were not visualized on small bowel enteroscopy.  March 2024.  Labs.  Hemoglobin 12.4.  MCV 86.  Platelet count 203,000.  Ferritin 17.  Iron level 65.  Saturation 16%.  EGD with small bowel enteroscopy 7/12/23 was aborted due to immediate laryngospasm.  Labs 12/29/23 show H/H 9.7/31, MCV 76.1, Plt 214, WBC 4.5. Iron 23, TIBC 419, iron sat 5. Ferritin 8. Normal BMP and hepatic function panel. Vitamin B12 331.